# Patient Record
Sex: FEMALE | Race: WHITE | NOT HISPANIC OR LATINO | URBAN - METROPOLITAN AREA
[De-identification: names, ages, dates, MRNs, and addresses within clinical notes are randomized per-mention and may not be internally consistent; named-entity substitution may affect disease eponyms.]

---

## 2017-01-04 DIAGNOSIS — C85.91 NON-HODGKIN LYMPHOMA, UNSPECIFIED, LYMPH NODES OF HEAD, FACE, AND NECK: ICD-10-CM

## 2017-01-24 PROBLEM — C85.91: Status: ACTIVE | Noted: 2017-01-24

## 2017-05-03 ENCOUNTER — OUTPATIENT (OUTPATIENT)
Dept: OUTPATIENT SERVICES | Facility: HOSPITAL | Age: 72
LOS: 1 days | Discharge: HOME | End: 2017-05-03

## 2017-05-05 ENCOUNTER — OUTPATIENT (OUTPATIENT)
Dept: OUTPATIENT SERVICES | Facility: HOSPITAL | Age: 72
LOS: 1 days | Discharge: HOME | End: 2017-05-05

## 2017-05-08 ENCOUNTER — OUTPATIENT (OUTPATIENT)
Dept: OUTPATIENT SERVICES | Facility: HOSPITAL | Age: 72
LOS: 1 days | Discharge: HOME | End: 2017-05-08

## 2017-05-26 ENCOUNTER — OUTPATIENT (OUTPATIENT)
Dept: OUTPATIENT SERVICES | Facility: HOSPITAL | Age: 72
LOS: 1 days | Discharge: HOME | End: 2017-05-26

## 2017-06-28 DIAGNOSIS — R79.9 ABNORMAL FINDING OF BLOOD CHEMISTRY, UNSPECIFIED: ICD-10-CM

## 2017-06-28 DIAGNOSIS — D50.9 IRON DEFICIENCY ANEMIA, UNSPECIFIED: ICD-10-CM

## 2017-06-28 DIAGNOSIS — D64.9 ANEMIA, UNSPECIFIED: ICD-10-CM

## 2017-06-28 DIAGNOSIS — L02.32 FURUNCLE OF BUTTOCK: ICD-10-CM

## 2017-07-21 ENCOUNTER — INPATIENT (INPATIENT)
Facility: HOSPITAL | Age: 72
LOS: 2 days | Discharge: HOME | End: 2017-07-24
Attending: INTERNAL MEDICINE | Admitting: FAMILY MEDICINE

## 2017-07-21 DIAGNOSIS — I10 ESSENTIAL (PRIMARY) HYPERTENSION: ICD-10-CM

## 2017-07-21 DIAGNOSIS — N17.9 ACUTE KIDNEY FAILURE, UNSPECIFIED: ICD-10-CM

## 2017-07-21 DIAGNOSIS — E11.9 TYPE 2 DIABETES MELLITUS WITHOUT COMPLICATIONS: ICD-10-CM

## 2017-07-21 DIAGNOSIS — E78.00 PURE HYPERCHOLESTEROLEMIA, UNSPECIFIED: ICD-10-CM

## 2017-07-21 DIAGNOSIS — R22.1 LOCALIZED SWELLING, MASS AND LUMP, NECK: ICD-10-CM

## 2017-07-21 DIAGNOSIS — M10.9 GOUT, UNSPECIFIED: ICD-10-CM

## 2017-07-21 DIAGNOSIS — L40.9 PSORIASIS, UNSPECIFIED: ICD-10-CM

## 2017-07-27 DIAGNOSIS — N18.9 CHRONIC KIDNEY DISEASE, UNSPECIFIED: ICD-10-CM

## 2017-07-27 DIAGNOSIS — E86.1 HYPOVOLEMIA: ICD-10-CM

## 2017-07-27 DIAGNOSIS — T45.1X5A ADVERSE EFFECT OF ANTINEOPLASTIC AND IMMUNOSUPPRESSIVE DRUGS, INITIAL ENCOUNTER: ICD-10-CM

## 2017-07-27 DIAGNOSIS — E87.1 HYPO-OSMOLALITY AND HYPONATREMIA: ICD-10-CM

## 2017-07-27 DIAGNOSIS — N39.0 URINARY TRACT INFECTION, SITE NOT SPECIFIED: ICD-10-CM

## 2017-07-27 DIAGNOSIS — Z79.84 LONG TERM (CURRENT) USE OF ORAL HYPOGLYCEMIC DRUGS: ICD-10-CM

## 2017-07-27 DIAGNOSIS — K27.9 PEPTIC ULCER, SITE UNSPECIFIED, UNSPECIFIED AS ACUTE OR CHRONIC, WITHOUT HEMORRHAGE OR PERFORATION: ICD-10-CM

## 2017-07-27 DIAGNOSIS — N18.3 CHRONIC KIDNEY DISEASE, STAGE 3 (MODERATE): ICD-10-CM

## 2017-07-27 DIAGNOSIS — E87.2 ACIDOSIS: ICD-10-CM

## 2017-07-27 DIAGNOSIS — E11.22 TYPE 2 DIABETES MELLITUS WITH DIABETIC CHRONIC KIDNEY DISEASE: ICD-10-CM

## 2017-07-27 DIAGNOSIS — E86.0 DEHYDRATION: ICD-10-CM

## 2017-07-27 DIAGNOSIS — D63.1 ANEMIA IN CHRONIC KIDNEY DISEASE: ICD-10-CM

## 2017-07-27 DIAGNOSIS — E11.42 TYPE 2 DIABETES MELLITUS WITH DIABETIC POLYNEUROPATHY: ICD-10-CM

## 2017-07-27 DIAGNOSIS — K59.00 CONSTIPATION, UNSPECIFIED: ICD-10-CM

## 2017-07-27 DIAGNOSIS — N17.9 ACUTE KIDNEY FAILURE, UNSPECIFIED: ICD-10-CM

## 2017-07-27 DIAGNOSIS — Z87.891 PERSONAL HISTORY OF NICOTINE DEPENDENCE: ICD-10-CM

## 2017-07-27 DIAGNOSIS — I12.9 HYPERTENSIVE CHRONIC KIDNEY DISEASE WITH STAGE 1 THROUGH STAGE 4 CHRONIC KIDNEY DISEASE, OR UNSPECIFIED CHRONIC KIDNEY DISEASE: ICD-10-CM

## 2017-07-27 DIAGNOSIS — M10.9 GOUT, UNSPECIFIED: ICD-10-CM

## 2017-07-27 DIAGNOSIS — L40.9 PSORIASIS, UNSPECIFIED: ICD-10-CM

## 2017-07-28 DIAGNOSIS — K12.1 OTHER FORMS OF STOMATITIS: ICD-10-CM

## 2018-10-04 ENCOUNTER — OUTPATIENT (OUTPATIENT)
Dept: OUTPATIENT SERVICES | Facility: HOSPITAL | Age: 73
LOS: 1 days | Discharge: HOME | End: 2018-10-04

## 2018-10-04 DIAGNOSIS — L08.0 PYODERMA: ICD-10-CM

## 2020-03-22 ENCOUNTER — TRANSCRIPTION ENCOUNTER (OUTPATIENT)
Age: 75
End: 2020-03-22

## 2020-03-22 ENCOUNTER — INPATIENT (INPATIENT)
Facility: HOSPITAL | Age: 75
LOS: 1 days | Discharge: HOME | End: 2020-03-24
Attending: INTERNAL MEDICINE | Admitting: INTERNAL MEDICINE
Payer: MEDICARE

## 2020-03-22 VITALS
HEART RATE: 82 BPM | TEMPERATURE: 98 F | OXYGEN SATURATION: 98 % | DIASTOLIC BLOOD PRESSURE: 63 MMHG | SYSTOLIC BLOOD PRESSURE: 132 MMHG | RESPIRATION RATE: 18 BRPM

## 2020-03-22 DIAGNOSIS — Z88.8 ALLERGY STATUS TO OTHER DRUGS, MEDICAMENTS AND BIOLOGICAL SUBSTANCES: ICD-10-CM

## 2020-03-22 DIAGNOSIS — N18.3 CHRONIC KIDNEY DISEASE, STAGE 3 (MODERATE): ICD-10-CM

## 2020-03-22 LAB
ALBUMIN SERPL ELPH-MCNC: 3.9 G/DL — SIGNIFICANT CHANGE UP (ref 3.5–5.2)
ALP SERPL-CCNC: 87 U/L — SIGNIFICANT CHANGE UP (ref 30–115)
ALT FLD-CCNC: 13 U/L — SIGNIFICANT CHANGE UP (ref 0–41)
ANION GAP SERPL CALC-SCNC: 14 MMOL/L — SIGNIFICANT CHANGE UP (ref 7–14)
ANION GAP SERPL CALC-SCNC: 17 MMOL/L — HIGH (ref 7–14)
AST SERPL-CCNC: 23 U/L — SIGNIFICANT CHANGE UP (ref 0–41)
BASE EXCESS BLDV CALC-SCNC: -6.7 MMOL/L — LOW (ref -2–2)
BILIRUB SERPL-MCNC: 0.4 MG/DL — SIGNIFICANT CHANGE UP (ref 0.2–1.2)
BUN SERPL-MCNC: 62 MG/DL — CRITICAL HIGH (ref 10–20)
BUN SERPL-MCNC: 67 MG/DL — CRITICAL HIGH (ref 10–20)
CA-I SERPL-SCNC: 1.17 MMOL/L — SIGNIFICANT CHANGE UP (ref 1.12–1.3)
CALCIUM SERPL-MCNC: 8.4 MG/DL — LOW (ref 8.5–10.1)
CALCIUM SERPL-MCNC: 9 MG/DL — SIGNIFICANT CHANGE UP (ref 8.5–10.1)
CHLORIDE SERPL-SCNC: 106 MMOL/L — SIGNIFICANT CHANGE UP (ref 98–110)
CHLORIDE SERPL-SCNC: 97 MMOL/L — LOW (ref 98–110)
CO2 SERPL-SCNC: 18 MMOL/L — SIGNIFICANT CHANGE UP (ref 17–32)
CO2 SERPL-SCNC: 18 MMOL/L — SIGNIFICANT CHANGE UP (ref 17–32)
CREAT SERPL-MCNC: 2.6 MG/DL — HIGH (ref 0.7–1.5)
CREAT SERPL-MCNC: 2.7 MG/DL — HIGH (ref 0.7–1.5)
D DIMER BLD IA.RAPID-MCNC: 383 NG/ML DDU — HIGH (ref 0–230)
ERYTHROCYTE [SEDIMENTATION RATE] IN BLOOD: 107 MM/HR — HIGH (ref 0–20)
FIBRINOGEN PPP-MCNC: >700 MG/DL — HIGH (ref 204.4–570.6)
FLU A RESULT: NEGATIVE — SIGNIFICANT CHANGE UP
FLU A RESULT: NEGATIVE — SIGNIFICANT CHANGE UP
FLUAV AG NPH QL: NEGATIVE — SIGNIFICANT CHANGE UP
FLUBV AG NPH QL: NEGATIVE — SIGNIFICANT CHANGE UP
GAS PNL BLDV: 134 MMOL/L — LOW (ref 136–145)
GAS PNL BLDV: SIGNIFICANT CHANGE UP
GLUCOSE BLDC GLUCOMTR-MCNC: 80 MG/DL — SIGNIFICANT CHANGE UP (ref 70–99)
GLUCOSE SERPL-MCNC: 138 MG/DL — HIGH (ref 70–99)
GLUCOSE SERPL-MCNC: 91 MG/DL — SIGNIFICANT CHANGE UP (ref 70–99)
HCO3 BLDV-SCNC: 20 MMOL/L — LOW (ref 22–29)
HCT VFR BLD CALC: 35.3 % — LOW (ref 37–47)
HCT VFR BLDA CALC: 37.5 % — SIGNIFICANT CHANGE UP (ref 34–44)
HGB BLD CALC-MCNC: 12.2 G/DL — LOW (ref 14–18)
HGB BLD-MCNC: 11.7 G/DL — LOW (ref 12–16)
LACTATE BLDV-MCNC: 1.8 MMOL/L — HIGH (ref 0.5–1.6)
LDH SERPL L TO P-CCNC: 272 — HIGH (ref 50–242)
MCHC RBC-ENTMCNC: 31.8 PG — HIGH (ref 27–31)
MCHC RBC-ENTMCNC: 33.1 G/DL — SIGNIFICANT CHANGE UP (ref 32–37)
MCV RBC AUTO: 95.9 FL — SIGNIFICANT CHANGE UP (ref 81–99)
NRBC # BLD: 0 /100 WBCS — SIGNIFICANT CHANGE UP (ref 0–0)
NT-PROBNP SERPL-SCNC: 680 PG/ML — HIGH (ref 0–300)
PCO2 BLDV: 42 MMHG — SIGNIFICANT CHANGE UP (ref 41–51)
PH BLDV: 7.28 — SIGNIFICANT CHANGE UP (ref 7.26–7.43)
PLATELET # BLD AUTO: 212 K/UL — SIGNIFICANT CHANGE UP (ref 130–400)
PO2 BLDV: 16 MMHG — LOW (ref 20–40)
POTASSIUM BLDV-SCNC: 4.5 MMOL/L — SIGNIFICANT CHANGE UP (ref 3.3–5.6)
POTASSIUM SERPL-MCNC: 5.3 MMOL/L — HIGH (ref 3.5–5)
POTASSIUM SERPL-MCNC: 5.4 MMOL/L — HIGH (ref 3.5–5)
POTASSIUM SERPL-SCNC: 5.3 MMOL/L — HIGH (ref 3.5–5)
POTASSIUM SERPL-SCNC: 5.4 MMOL/L — HIGH (ref 3.5–5)
PROT SERPL-MCNC: 6.3 G/DL — SIGNIFICANT CHANGE UP (ref 6–8)
RBC # BLD: 3.68 M/UL — LOW (ref 4.2–5.4)
RBC # FLD: 13.7 % — SIGNIFICANT CHANGE UP (ref 11.5–14.5)
RSV RESULT: NEGATIVE — SIGNIFICANT CHANGE UP
RSV RNA RESP QL NAA+PROBE: NEGATIVE — SIGNIFICANT CHANGE UP
SAO2 % BLDV: 16 % — SIGNIFICANT CHANGE UP
SODIUM SERPL-SCNC: 132 MMOL/L — LOW (ref 135–146)
SODIUM SERPL-SCNC: 138 MMOL/L — SIGNIFICANT CHANGE UP (ref 135–146)
TROPONIN T SERPL-MCNC: <0.01 NG/ML — SIGNIFICANT CHANGE UP
WBC # BLD: 6.37 K/UL — SIGNIFICANT CHANGE UP (ref 4.8–10.8)
WBC # FLD AUTO: 6.37 K/UL — SIGNIFICANT CHANGE UP (ref 4.8–10.8)

## 2020-03-22 PROCEDURE — 71045 X-RAY EXAM CHEST 1 VIEW: CPT | Mod: 26

## 2020-03-22 PROCEDURE — 93010 ELECTROCARDIOGRAM REPORT: CPT

## 2020-03-22 PROCEDURE — 99285 EMERGENCY DEPT VISIT HI MDM: CPT | Mod: GC

## 2020-03-22 RX ORDER — SODIUM CHLORIDE 9 MG/ML
1000 INJECTION INTRAMUSCULAR; INTRAVENOUS; SUBCUTANEOUS ONCE
Refills: 0 | Status: COMPLETED | OUTPATIENT
Start: 2020-03-22 | End: 2020-03-22

## 2020-03-22 RX ORDER — SIMVASTATIN 20 MG/1
40 TABLET, FILM COATED ORAL AT BEDTIME
Refills: 0 | Status: DISCONTINUED | OUTPATIENT
Start: 2020-03-22 | End: 2020-03-24

## 2020-03-22 RX ORDER — FOLIC ACID 0.8 MG
1 TABLET ORAL
Qty: 0 | Refills: 0 | DISCHARGE

## 2020-03-22 RX ORDER — SODIUM CHLORIDE 9 MG/ML
1000 INJECTION INTRAMUSCULAR; INTRAVENOUS; SUBCUTANEOUS
Refills: 0 | Status: DISCONTINUED | OUTPATIENT
Start: 2020-03-22 | End: 2020-03-23

## 2020-03-22 RX ORDER — ACETAMINOPHEN 500 MG
975 TABLET ORAL ONCE
Refills: 0 | Status: COMPLETED | OUTPATIENT
Start: 2020-03-22 | End: 2020-03-22

## 2020-03-22 RX ORDER — VERAPAMIL HCL 240 MG
1 CAPSULE, EXTENDED RELEASE PELLETS 24 HR ORAL
Qty: 0 | Refills: 0 | DISCHARGE

## 2020-03-22 RX ORDER — METHOTREXATE 2.5 MG/1
0 TABLET ORAL
Qty: 0 | Refills: 0 | DISCHARGE

## 2020-03-22 RX ORDER — VERAPAMIL HCL 240 MG
180 CAPSULE, EXTENDED RELEASE PELLETS 24 HR ORAL DAILY
Refills: 0 | Status: DISCONTINUED | OUTPATIENT
Start: 2020-03-22 | End: 2020-03-24

## 2020-03-22 RX ORDER — HEPARIN SODIUM 5000 [USP'U]/ML
5000 INJECTION INTRAVENOUS; SUBCUTANEOUS EVERY 12 HOURS
Refills: 0 | Status: DISCONTINUED | OUTPATIENT
Start: 2020-03-22 | End: 2020-03-24

## 2020-03-22 RX ORDER — FOLIC ACID 0.8 MG
1 TABLET ORAL DAILY
Refills: 0 | Status: DISCONTINUED | OUTPATIENT
Start: 2020-03-22 | End: 2020-03-24

## 2020-03-22 RX ORDER — ALLOPURINOL 300 MG
300 TABLET ORAL DAILY
Refills: 0 | Status: DISCONTINUED | OUTPATIENT
Start: 2020-03-22 | End: 2020-03-22

## 2020-03-22 RX ORDER — LISINOPRIL 2.5 MG/1
20 TABLET ORAL DAILY
Refills: 0 | Status: DISCONTINUED | OUTPATIENT
Start: 2020-03-22 | End: 2020-03-22

## 2020-03-22 RX ORDER — METOPROLOL TARTRATE 50 MG
1 TABLET ORAL
Qty: 0 | Refills: 0 | DISCHARGE

## 2020-03-22 RX ORDER — SIMVASTATIN 20 MG/1
1 TABLET, FILM COATED ORAL
Qty: 0 | Refills: 0 | DISCHARGE

## 2020-03-22 RX ORDER — METHOTREXATE 2.5 MG/1
2.5 TABLET ORAL
Refills: 0 | Status: DISCONTINUED | OUTPATIENT
Start: 2020-03-24 | End: 2020-03-24

## 2020-03-22 RX ADMIN — SODIUM CHLORIDE 1000 MILLILITER(S): 9 INJECTION INTRAMUSCULAR; INTRAVENOUS; SUBCUTANEOUS at 18:29

## 2020-03-22 RX ADMIN — Medication 975 MILLIGRAM(S): at 18:29

## 2020-03-22 RX ADMIN — SIMVASTATIN 40 MILLIGRAM(S): 20 TABLET, FILM COATED ORAL at 22:02

## 2020-03-22 RX ADMIN — Medication 1 MILLIGRAM(S): at 22:02

## 2020-03-22 NOTE — H&P ADULT - ATTENDING COMMENTS
I saw and evaluated the patient. I have reviewed and agree with the findings and plan of care as documented above in the resident’s note (unless indicated differently below). Any necessary changes were made in the body of the text.    73 yo F pt p/w generalized weakness and intermittent subjective fevers for about one week. Associated w/ mild SOB, non-productive coughing and diarrhea. Symptoms persistent and progressively worsening. Sick contact – went to local event 2 weeks ago where she may have been exposed. Denies foreign travel. ROS negative for myalgias, arthralgias, nausea, vomiting, dysuria, headaches and skin changes. States that nothing like this has happened to her before.    ROS:  Constitutional: +fever; no weight loss; +generalized weakness  Eyes: no conjunctivitis or itching  ENT: no dysphagia or odynophagia  CVS: no LE swelling or PND  Resp: +SOB; +coughing   GI: no nausea; no vomiting; +diarrhea  : no dysuria or hematuria  MSK: no myalgias; no arthralgias  Skin: no skin rashes  All other systems reviewed and are negative    PMHx: psoriasis, gout, HTN, HLD  Meds: lisinopril 20, allopurinol 300, methotrexate 2.5 weekly, folic acid, simvastatin 20  PSurHx: lump removal – neck  FHx: HTN – father  SocHx: up to 2 PPD former smoker x 20 yrs (stopped “many yrs ago”); no alcohol, drug use    Exam:  Vitals: BP = 167/89; P = 60; T = 98.6  General: appears stated age; pleasant and cooperative  Eyes: anicteric sclerae; moist conjunctiva; no lid lag; PERRLA  HENT: AT/NC; clear oropharynx w/ moist mucous membranes  Neck: supple w/ FROM; trachea midline; no thyromegaly  Lungs: lungs cta b/l; no tachypnea; non-labored breathing  CVS: RRR; S1 and S2 w/o MRG  Abd: BS+; soft; non tender to palpation; no masses or HSM  Extremities: LE’s non-edematous; peripheral pulses 2+ b/l  Skin: skin turgor, texture normal; no rashes, nodules or ulcers  Psych: appropriate affect; alert; oriented to person, place, time and situation    Labs: normocytic anemia; mild hyperkalemia; BUN/Cr = 62/2.7 (40/1.25 on 2017); LDH = 272; d-dimer 383; fibrinogen > 700; VB.28/42 w/ bicarb 18 on BMP; hyperkalemia  EKG: NSR; no ST-T wave changes; no peaking of T waves  CXR: b/l infiltrates    Assessment:  (1) Generalized weakness, SOB and coughing: suspect viral respiratory syndrome (r/o covid-19)  (2) Suspect acute renal insufficiency possibly pre-renal 2/2 ongoing ailment and poor PO intake  (3) VBG shows resp acidosis and combined high AG and non AG metabolic acidosis which is likely 2/2 renal failure  (4) Hx of CKD possibly stage 3 (old records reveal Cr values ranging from 1.25 to 4.4 (in 2017)  (5) Hypertension – need for optimization  (6) Dyslipidemia – on out-pt statin therapy  (7) Hx of psoriasis – stable on methotrexate  (8) Hx of gout – w/o acute symptoms on allopurinol  (9) Normocytic anemia likely anemia of chronic disease    Plan:  (1) Supportive care: anti-pyretics, anti-emetics, analgesics and anti-tussives to be given PRN  (2) Isolation: airborne/contact/droplet  (3) F/u CRP, CPK, procalcitonin, lipid panel and ferritin; trend CBC and LFT’s  (4) Hold off on hydroxychloroquine – pt is not hypoxic  (5) Avoid high flow and NIPPV - aerosol generating   (6) Avoid NSAIDs and systemic glucocorticoids  (7) IVF’s – gentle hydration (trend Cr); send U/A, urine Na, urine Cr  (8) Advance diet as tolerated  (9) C/w meds as ordered    Code status: full code

## 2020-03-22 NOTE — ED PROVIDER NOTE - CLINICAL SUMMARY MEDICAL DECISION MAKING FREE TEXT BOX
Patient presented with generalized weakness, diarrhea, cough that has been worsening. Otherwise patient afebrile, HD stable. Obtained labs which showed (+) PINEDA compared to baseline, as well as metabolic acidosis which is 2/2 likely to diarrhea. Flu swab negative, but CXR with bilateral opacities, suspicious for COVID. Patient had been placed on iso initially and remained on iso precautions during ED course. Despite tx in ED patient still feeling weak, does not feel safe going home. Will admit for further monitoring and management. Patient agreeable with plan. Hd stable at time of admission.

## 2020-03-22 NOTE — ED PROVIDER NOTE - OBJECTIVE STATEMENT
73 yo F with hx of HTN, DM, presents for evaluation of generalized weakness, ongoing for 3 days, associated with diarrhea, described as loose. Pt also reports cough and dyspnea on exertion. No fever, no chills, no headache, no nausea, no vomiting, no chest pain, no back pain, no rash, no recent travel, no recent abx use. Pt states she went to urgent care, and was told to come to the ED for further testing. No other symptoms reported.  PMD: Moreno Gardner

## 2020-03-22 NOTE — H&P ADULT - HISTORY OF PRESENT ILLNESS
73 yo F with hx of HTN, DM, presents for evaluation of generalized weakness, ongoing for 3 days, associated with diarrhea, described as loose.   Pt also reports cough and dyspnea on exertion. No fever, no chills, no headache, no nausea, no vomiting, no chest pain, no back pain, no rash, no recent travel, no recent abx use.     Pt states she went to urgent care, and was told to come to the ED for further testing. No other symptoms reported. 75 yo F with Med hx of HTN, DM complicated by Neuropathy, Gout, Psoriasis, presents for evaluation of generalized weakness, Dry Cough, SOB for 3-4 days duration. Patient states that she was at a function 2 weeks ago and she recently found out that her daughter's sister in-law who was also at the function tested positive for COVID and is currently on quarantine at home. Pt states she went to urgent care, and was told to come to the ED for further testing. Patient also endorses diarrhea for about one week which got worse today, associated with blood when she wipes but its consistent with her hemorrhoids     Patient denies fever, chills, headache, Abdominal pain, nausea, vomiting, Chest pain, Palpitation, no recent travel     In ED vitals and Labs were unremarkable

## 2020-03-22 NOTE — ED PROVIDER NOTE - CARE PLAN
Principal Discharge DX:	Weakness  Secondary Diagnosis:	Diarrhea  Secondary Diagnosis:	Acute on chronic kidney failure

## 2020-03-22 NOTE — H&P ADULT - ASSESSMENT
73 yo F with hx of HTN, DM, presents for evaluation of generalized weakness,            PINEDA on CKD    H/O Essential Hypertension    H/O DM II   -Takes .............  -FS before meals and at bed time  -Carb Consistent diet  -Insulin regimen     DVT PPX// GI PPX  Full Code  Dispo from   -Pending COVID r/o 73 yo F with Med hx of HTN, DM complicated by Neuropathy, Gout, Psoriasis, presents for evaluation of generalized weakness, Dry Cough, SOB for 3-4 days duration.    Generalized weakness/dry cough and SOB: R/O COVID  -High Probability of COVID  from recent exposure   -No fever, no Lymphopenia, FLU and RSV neg   -CXR: Shows B/L infiltrates similar to prior xrays, (Pending official read)  -Saturating well on RA (98%) , Consider CT chest if patients respiratory status declines   -Associated Covid labs Ordered     PINEDA on CKD: likely pre-renal from Diarrhea and low PO intake   -Baseline SCr unknown ( last Scr in 2017: 1.25)   -IVF for additional 24hrs   -monitor BMP     H/O Essential Hypertension  -c/w Verapamil and lisinopril    H/O DM II   -Diet Controlled, per pt: A1C: 5.5% for the last 2 yrs   -Refusing FS  -Carb Consistent diet    DVT PPX// GI PPX: Heparin Sub Q// not indicated   Full Code  Dispo from   -Pending COVID r/o 73 yo F with Med hx of HTN, DM complicated by Neuropathy, Gout, Psoriasis, presents for evaluation of generalized weakness, Dry Cough, SOB for 3-4 days duration.    Generalized weakness/dry cough and SOB: R/O COVID  -High Probability of COVID  from recent exposure   -No fever, no Lymphopenia, FLU and RSV neg   -CXR: Shows B/L infiltrates similar to prior xrays, (Pending official read)  -Saturating well on RA (98%) , Consider CT chest if patients respiratory status declines   -Associated Covid labs Ordered   -F/Up ID consult     PINEDA on CKD: likely pre-renal from Diarrhea and low PO intake   -Baseline SCr unknown ( last Scr in 2017: 1.25)   -IVF for additional 24hrs   -monitor BMP     H/O Essential Hypertension: appears controlled   -c/w Verapamil and lisinopril    H/O DM II   -Diet Controlled, per pt: A1C: 5.5% for the last 2 yrs   -Refusing FS, c/w Carb Consistent diet    DVT PPX// GI PPX: Heparin Sub Q// not indicated   Full Code  Dispo from home   -Pending COVID r/o

## 2020-03-22 NOTE — H&P ADULT - NSHPLABSRESULTS_GEN_ALL_CORE
LABS:                          11.7   6.37  )-----------( 212      ( 22 Mar 2020 17:06 )             35.3     03-22    132<L>  |  97<L>  |  67<HH>  ----------------------------<  138<H>  5.3<H>   |  18  |  2.6<H>    Ca    9.0      22 Mar 2020 17:06    TPro  6.3  /  Alb  3.9  /  TBili  0.4  /  DBili  x   /  AST  23  /  ALT  13  /  AlkPhos  87  03-22              Lactate Trend    CARDIAC MARKERS ( 22 Mar 2020 17:06 )  x     / <0.01 ng/mL / x     / x     / x          CAPILLARY BLOOD GLUCOSE            RADIOLOGY:            EKGS:

## 2020-03-22 NOTE — H&P ADULT - NSICDXPASTMEDICALHX_GEN_ALL_CORE_FT
PAST MEDICAL HISTORY:  CKD (chronic kidney disease)     DM (diabetes mellitus)     High cholesterol     HTN (hypertension)     Lymphoma PAST MEDICAL HISTORY:  CKD (chronic kidney disease)     DM (diabetes mellitus)     Gout     High cholesterol     HTN (hypertension)     Lymphoma     Psoriasis

## 2020-03-22 NOTE — ED ADULT NURSE NOTE - OBJECTIVE STATEMENT
Pt arrived to ED due to feeling of exhaustion and lethargic for past 3 days. Pt c/o of SOB on exertion, black watery stool  3x/day for past 3 days. Pt assessed, A&ox4, vss, Pt denies any fever, n/v. Pt's abdomen is soft non-tender. B/L lungs auscultated - clear. Will continue to monitor and assess.

## 2020-03-22 NOTE — ED ADULT TRIAGE NOTE - CHIEF COMPLAINT QUOTE
feeling very exhausted and sleepy, feels dyspnea on exertion and states she had black watery stools 3 times a day for the past 3 or 4 days, coming from urgent care for further evaluation had positive guaiac

## 2020-03-22 NOTE — ED PROVIDER NOTE - ATTENDING CONTRIBUTION TO CARE
74 year old female, pmhx as documented above, presenting with generalized weakness x 3 days associated with diarrhea. Patient also with cough and dyspnea on exertion over this time. Went to  and sent here for evaluation. Patient denies having this before. Otherwise denies pain and denies fever, headache, vision changes, numbness, confusion, URI symptoms, neck pain, chest pain, back pain, dyspnea, palpitations, nausea, vomiting, abdominal pain, constipation, blood in stool/dark stools, urinary symptoms, vaginal bleeding/discharge, leg swelling, rash, recent travel or sick contacts.    Vital Signs: I have reviewed the initial vital signs.  Constitutional: NAD, well-nourished, appears stated age, no acute distress.  HEENT: Airway patent, moist MM, no erythema/swelling/deformity of oral structures. EOMI, PERRLA.  CV: regular rate, regular rhythm, well-perfused extremities, 2+ b/l DP and radial pulses equal.  Lungs: BCTA, no increased WOB.  ABD: NTND, no guarding or rebound, no pulsatile mass, no hernias.   MSK: Neck supple, nontender, nl ROM, no stepoff. Chest nontender. Back nontender in TLS spine or to b/l bony structures or flanks. Ext nontender, nl rom, no deformity.   INTEG: Skin warm, dry, no rash.  NEURO: A&Ox3, normal strength, nl sensation throughout, normal speech.   PSYCH: Calm, cooperative, normal affect and interaction.    Will obtain labs, CXR, Flu swab, consider COVID swab as well, re-eval.

## 2020-03-22 NOTE — ED PROVIDER NOTE - PMH
CKD (chronic kidney disease)    DM (diabetes mellitus)    Gout    High cholesterol    HTN (hypertension)    Lymphoma    Psoriasis

## 2020-03-22 NOTE — ED ADULT NURSE NOTE - PMH
CKD (chronic kidney disease)    DM (diabetes mellitus)    High cholesterol    HTN (hypertension)    Lymphoma

## 2020-03-22 NOTE — ED PROVIDER NOTE - GASTROINTESTINAL, MLM
Abdomen soft, non-tender, non distended, no rebound, no rigidity, no guarding. NO CVA tenderness. Rectal: hemorrhoids, no stool, no black stool

## 2020-03-22 NOTE — H&P ADULT - NSHPPHYSICALEXAM_GEN_ALL_CORE
General    Heart    Lungs    Abdomen    Musculoskeletal    Neuro Deferred at this time, Pls see attending' s note to follow

## 2020-03-23 LAB
ALBUMIN SERPL ELPH-MCNC: 3.4 G/DL — LOW (ref 3.5–5.2)
ALP SERPL-CCNC: 75 U/L — SIGNIFICANT CHANGE UP (ref 30–115)
ALT FLD-CCNC: 11 U/L — SIGNIFICANT CHANGE UP (ref 0–41)
ANION GAP SERPL CALC-SCNC: 13 MMOL/L — SIGNIFICANT CHANGE UP (ref 7–14)
APPEARANCE UR: ABNORMAL
AST SERPL-CCNC: 21 U/L — SIGNIFICANT CHANGE UP (ref 0–41)
BACTERIA # UR AUTO: NEGATIVE — SIGNIFICANT CHANGE UP
BASOPHILS # BLD AUTO: 0.02 K/UL — SIGNIFICANT CHANGE UP (ref 0–0.2)
BASOPHILS NFR BLD AUTO: 0.4 % — SIGNIFICANT CHANGE UP (ref 0–1)
BILIRUB SERPL-MCNC: 0.3 MG/DL — SIGNIFICANT CHANGE UP (ref 0.2–1.2)
BILIRUB UR-MCNC: NEGATIVE — SIGNIFICANT CHANGE UP
BUN SERPL-MCNC: 63 MG/DL — CRITICAL HIGH (ref 10–20)
CALCIUM SERPL-MCNC: 8.5 MG/DL — SIGNIFICANT CHANGE UP (ref 8.5–10.1)
CHLORIDE SERPL-SCNC: 106 MMOL/L — SIGNIFICANT CHANGE UP (ref 98–110)
CO2 SERPL-SCNC: 16 MMOL/L — LOW (ref 17–32)
COLOR SPEC: YELLOW — SIGNIFICANT CHANGE UP
CREAT ?TM UR-MCNC: 114 MG/DL — SIGNIFICANT CHANGE UP
CREAT SERPL-MCNC: 2.3 MG/DL — HIGH (ref 0.7–1.5)
CRP SERPL-MCNC: 6.1 MG/DL — HIGH (ref 0–0.4)
DIFF PNL FLD: NEGATIVE — SIGNIFICANT CHANGE UP
EOSINOPHIL # BLD AUTO: 0.11 K/UL — SIGNIFICANT CHANGE UP (ref 0–0.7)
EOSINOPHIL NFR BLD AUTO: 2.1 % — SIGNIFICANT CHANGE UP (ref 0–8)
EPI CELLS # UR: 3 /HPF — SIGNIFICANT CHANGE UP (ref 0–5)
FERRITIN SERPL-MCNC: 753 NG/ML — HIGH (ref 15–150)
GLUCOSE BLDC GLUCOMTR-MCNC: 106 MG/DL — HIGH (ref 70–99)
GLUCOSE BLDC GLUCOMTR-MCNC: 114 MG/DL — HIGH (ref 70–99)
GLUCOSE BLDC GLUCOMTR-MCNC: 148 MG/DL — HIGH (ref 70–99)
GLUCOSE BLDC GLUCOMTR-MCNC: 71 MG/DL — SIGNIFICANT CHANGE UP (ref 70–99)
GLUCOSE SERPL-MCNC: 74 MG/DL — SIGNIFICANT CHANGE UP (ref 70–99)
GLUCOSE UR QL: NEGATIVE — SIGNIFICANT CHANGE UP
HCT VFR BLD CALC: 33 % — LOW (ref 37–47)
HGB BLD-MCNC: 10.5 G/DL — LOW (ref 12–16)
HYALINE CASTS # UR AUTO: 8 /LPF — HIGH (ref 0–7)
IMM GRANULOCYTES NFR BLD AUTO: 0.4 % — HIGH (ref 0.1–0.3)
KETONES UR-MCNC: NEGATIVE — SIGNIFICANT CHANGE UP
LEUKOCYTE ESTERASE UR-ACNC: NEGATIVE — SIGNIFICANT CHANGE UP
LYMPHOCYTES # BLD AUTO: 0.9 K/UL — LOW (ref 1.2–3.4)
LYMPHOCYTES # BLD AUTO: 17.4 % — LOW (ref 20.5–51.1)
MCHC RBC-ENTMCNC: 30.3 PG — SIGNIFICANT CHANGE UP (ref 27–31)
MCHC RBC-ENTMCNC: 31.8 G/DL — LOW (ref 32–37)
MCV RBC AUTO: 95.4 FL — SIGNIFICANT CHANGE UP (ref 81–99)
MONOCYTES # BLD AUTO: 0.39 K/UL — SIGNIFICANT CHANGE UP (ref 0.1–0.6)
MONOCYTES NFR BLD AUTO: 7.6 % — SIGNIFICANT CHANGE UP (ref 1.7–9.3)
NEUTROPHILS # BLD AUTO: 3.72 K/UL — SIGNIFICANT CHANGE UP (ref 1.4–6.5)
NEUTROPHILS NFR BLD AUTO: 72.1 % — SIGNIFICANT CHANGE UP (ref 42.2–75.2)
NITRITE UR-MCNC: NEGATIVE — SIGNIFICANT CHANGE UP
NRBC # BLD: 0 /100 WBCS — SIGNIFICANT CHANGE UP (ref 0–0)
PH UR: 5.5 — SIGNIFICANT CHANGE UP (ref 5–8)
PLATELET # BLD AUTO: 186 K/UL — SIGNIFICANT CHANGE UP (ref 130–400)
POTASSIUM SERPL-MCNC: 5.2 MMOL/L — HIGH (ref 3.5–5)
POTASSIUM SERPL-SCNC: 5.2 MMOL/L — HIGH (ref 3.5–5)
PROCALCITONIN SERPL-MCNC: 0.25 NG/ML — HIGH (ref 0.02–0.1)
PROT SERPL-MCNC: 5.4 G/DL — LOW (ref 6–8)
PROT UR-MCNC: SIGNIFICANT CHANGE UP
RBC # BLD: 3.46 M/UL — LOW (ref 4.2–5.4)
RBC # FLD: 13.7 % — SIGNIFICANT CHANGE UP (ref 11.5–14.5)
RBC CASTS # UR COMP ASSIST: 3 /HPF — SIGNIFICANT CHANGE UP (ref 0–4)
SODIUM SERPL-SCNC: 135 MMOL/L — SIGNIFICANT CHANGE UP (ref 135–146)
SODIUM UR-SCNC: 22 MMOL/L — SIGNIFICANT CHANGE UP
SP GR SPEC: 1.01 — SIGNIFICANT CHANGE UP (ref 1.01–1.02)
UROBILINOGEN FLD QL: SIGNIFICANT CHANGE UP
WBC # BLD: 5.16 K/UL — SIGNIFICANT CHANGE UP (ref 4.8–10.8)
WBC # FLD AUTO: 5.16 K/UL — SIGNIFICANT CHANGE UP (ref 4.8–10.8)
WBC UR QL: 17 /HPF — HIGH (ref 0–5)

## 2020-03-23 PROCEDURE — 99233 SBSQ HOSP IP/OBS HIGH 50: CPT

## 2020-03-23 RX ORDER — SODIUM CHLORIDE 9 MG/ML
1000 INJECTION, SOLUTION INTRAVENOUS
Refills: 0 | Status: DISCONTINUED | OUTPATIENT
Start: 2020-03-23 | End: 2020-03-24

## 2020-03-23 RX ADMIN — Medication 1 MILLIGRAM(S): at 12:39

## 2020-03-23 RX ADMIN — SODIUM CHLORIDE 60 MILLILITER(S): 9 INJECTION INTRAMUSCULAR; INTRAVENOUS; SUBCUTANEOUS at 00:10

## 2020-03-23 RX ADMIN — SODIUM CHLORIDE 60 MILLILITER(S): 9 INJECTION, SOLUTION INTRAVENOUS at 17:47

## 2020-03-23 RX ADMIN — SIMVASTATIN 40 MILLIGRAM(S): 20 TABLET, FILM COATED ORAL at 21:35

## 2020-03-23 RX ADMIN — HEPARIN SODIUM 5000 UNIT(S): 5000 INJECTION INTRAVENOUS; SUBCUTANEOUS at 17:47

## 2020-03-23 RX ADMIN — Medication 180 MILLIGRAM(S): at 05:16

## 2020-03-23 RX ADMIN — HEPARIN SODIUM 5000 UNIT(S): 5000 INJECTION INTRAVENOUS; SUBCUTANEOUS at 05:16

## 2020-03-23 NOTE — CONSULT NOTE ADULT - ASSESSMENT
ASSESSMENT  73 yo F with Med hx of HTN, DM complicated by Neuropathy, Gout, Psoriasis, presents for evaluation of generalized weakness, Dry Cough, SOB for 3-4 days duration. Patient states that she was at a function 2 weeks ago and she recently found out that her daughter's sister in-law who was also at the function tested positive for COVID and is currently on quarantine at home.    IMPRESSION  #ruling out covid-19, +contact   no hypoxemia, afebrile    WBC 5 0.90 abs lymphs    < from: Xray Chest 1 View-PORTABLE IMMEDIATE (03.22.20 @ 17:27) >  Questionable new 9 mm right upper lobe nodular opacity; if clinically warranted, further evaluation may be obtained with dedicated CT chest  #Sepsis ruled out on admission   #Hyponatremia  #Immunosuppressed on MTX    RECOMMENDATIONS  - monitor off antimicrobials  - D/C as soon as possible   - outpatient CT for possible opacity     Spectra [id number]
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 CKD / prerenal ? improved with IVF / acidosis due to diarrhea   ·	continue IVF and switch to 1/2 NS with 75 meq of bicarb   ·	check UA and urine lytes   ·	check renal bladder sono  ·	to rule out Covid 19   ·	no need for RRT    will follow

## 2020-03-23 NOTE — CONSULT NOTE ADULT - SUBJECTIVE AND OBJECTIVE BOX
NEPHROLOGY CONSULTATION NOTE    THIS CONSULT IS INCOMPLETE / FULL CONSULT TO FOLLOW    Patient is a 74y Female whom presented to the hospital with     PAST MEDICAL & SURGICAL HISTORY:  Psoriasis  Gout  High cholesterol  DM (diabetes mellitus)  Lymphoma  CKD (chronic kidney disease)  HTN (hypertension)    Allergies:  predniSONE (Unknown)    Home Medications Reviewed  Hospital Medications:   MEDICATIONS  (STANDING):  folic acid 1 milliGRAM(s) Oral daily  heparin  Injectable 5000 Unit(s) SubCutaneous every 12 hours  simvastatin 40 milliGRAM(s) Oral at bedtime  sodium chloride 0.9%. 1000 milliLiter(s) (60 mL/Hr) IV Continuous <Continuous>  verapamil  milliGRAM(s) Oral daily      SOCIAL HISTORY:  Denies ETOH,Smoking,   FAMILY HISTORY:        REVIEW OF SYSTEMS:  CONSTITUTIONAL: No weakness, fevers or chills  EYES/ENT: No visual changes;  No vertigo or throat pain   NECK: No pain or stiffness  RESPIRATORY: No cough, wheezing, hemoptysis; No shortness of breath  CARDIOVASCULAR: No chest pain or palpitations.  GASTROINTESTINAL: No abdominal or epigastric pain. No nausea, vomiting, or hematemesis; No diarrhea or constipation. No melena or hematochezia.  GENITOURINARY: No dysuria, frequency, foamy urine, urinary urgency, incontinence or hematuria  NEUROLOGICAL: No numbness or weakness  SKIN: No itching, burning, rashes, or lesions   VASCULAR: No bilateral lower extremity edema.   All other review of systems is negative unless indicated above.    VITALS:  T(F): 98 (20 @ 13:03), Max: 98.9 (20 @ 17:03)  HR: 70 (20 @ 13:03)  BP: 138/62 (20 @ 13:03)  RR: 19 (20 @ 13:03)  SpO2: 98% (20 @ 00:07)        I&O's Detail        PHYSICAL EXAM:  Constitutional: NAD  HEENT: anicteric sclera, oropharynx clear, MMM  Neck: No JVD  Respiratory: CTAB, no wheezes, rales or rhonchi  Cardiovascular: S1, S2, RRR  Gastrointestinal: BS+, soft, NT/ND  Extremities: No cyanosis or clubbing. No peripheral edema  Neurological: A/O x 3, no focal deficits  Psychiatric: Normal mood, normal affect  : No CVA tenderness. No richey.   Skin: No rashes  Vascular Access:    LABS:      135  |  106  |  63<HH>  ----------------------------<  74  5.2<H>   |  16<L>  |  2.3<H>    Ca    8.5      23 Mar 2020 04:45    TPro  5.4<L>  /  Alb  3.4<L>  /  TBili  0.3  /  DBili      /  AST  21  /  ALT  11  /  AlkPhos  75      Creatinine Trend: 2.3 <--, 2.7 <--, 2.6 <--                        10.5   5.16  )-----------( 186      ( 23 Mar 2020 04:45 )             33.0     Urine Studies:  Urinalysis Basic - ( 23 Mar 2020 00:00 )    Color: Yellow / Appearance: Slightly Turbid / S.011 / pH:   Gluc:  / Ketone: Negative  / Bili: Negative / Urobili: <2 mg/dL   Blood:  / Protein: Trace / Nitrite: Negative   Leuk Esterase: Negative / RBC: 3 /HPF / WBC 17 /HPF   Sq Epi:  / Non Sq Epi: 3 /HPF / Bacteria: Negative      Creatinine, Random Urine: 114 mg/dL ( @ 00:00)  Sodium, Random Urine: 22.0 mmoL/L ( @ 00:00)            RADIOLOGY & ADDITIONAL STUDIES: NEPHROLOGY CONSULTATION NOTE    75 yo F with Med hx of HTN, DM complicated by Neuropathy, Gout, Psoriasis lymphoma , presenting for cough and fever of several days duration. Patient said she was in contact with a family member who tested positive for Covid 19 .  To note that patient developed diarrhea for several days no hematemesis no melena no rash no dysuria no hematuria     PAST MEDICAL & SURGICAL HISTORY:  Psoriasis  Gout  High cholesterol  DM (diabetes mellitus)  Lymphoma  CKD (chronic kidney disease)  HTN (hypertension)    Allergies:  predniSONE (Unknown)    Home Medications Reviewed  Hospital Medications:   MEDICATIONS  (STANDING):  folic acid 1 milliGRAM(s) Oral daily  heparin  Injectable 5000 Unit(s) SubCutaneous every 12 hours  simvastatin 40 milliGRAM(s) Oral at bedtime  sodium chloride 0.9%. 1000 milliLiter(s) (60 mL/Hr) IV Continuous <Continuous>  verapamil  milliGRAM(s) Oral daily      SOCIAL HISTORY:  Denies ETOH,Smoking,   FAMILY HISTORY:        REVIEW OF SYSTEMS:    All other review of systems is negative unless indicated above.    VITALS:  T(F): 98 (20 @ 13:03), Max: 98.9 (20 @ 17:03)  HR: 70 (20 @ 13:03)  BP: 138/62 (20 @ 13:03)  RR: 19 (20 @ 13:03)  SpO2: 98% (20 @ 00:07)        I&O's Detail        LABS:      135  |  106  |  63<HH>  ----------------------------<  74  5.2<H>   |  16<L>  |  2.3<H>    Ca    8.5      23 Mar 2020 04:45    TPro  5.4<L>  /  Alb  3.4<L>  /  TBili  0.3  /  DBili      /  AST  21  /  ALT  11  /  AlkPhos  75      Creatinine Trend: 2.3 <--, 2.7 <--, 2.6 <--                        10.5   5.16  )-----------( 186      ( 23 Mar 2020 04:45 )             33.0     Urine Studies:  Urinalysis Basic - ( 23 Mar 2020 00:00 )    Color: Yellow / Appearance: Slightly Turbid / S.011 / pH:   Gluc:  / Ketone: Negative  / Bili: Negative / Urobili: <2 mg/dL   Blood:  / Protein: Trace / Nitrite: Negative   Leuk Esterase: Negative / RBC: 3 /HPF / WBC 17 /HPF   Sq Epi:  / Non Sq Epi: 3 /HPF / Bacteria: Negative      Creatinine, Random Urine: 114 mg/dL ( @ 00:00)  Sodium, Random Urine: 22.0 mmoL/L ( @ 00:00)            RADIOLOGY & ADDITIONAL STUDIES:      < from: Xray Chest 1 View-PORTABLE IMMEDIATE (20 @ 17:27) >    Impression:      1. Questionable new 9 mm right upper lobe nodular opacity; if clinically warranted, further evaluation may be obtained with dedicated CT chest    < end of copied text >

## 2020-03-23 NOTE — PROGRESS NOTE ADULT - SUBJECTIVE AND OBJECTIVE BOX
SELINA ADAMS 74y Female  MRN#: [id number]   Hospital Day: 1d    SUBJECTIVE  Patient is a 74y old Female who presents with a chief complaint of R/O COVID (23 Mar 2020 15:16)  Currently admitted to medicine with the primary diagnosis of Weakness    INTERVAL HPI AND OVERNIGHT EVENTS:  I attempted to call the patient today at her personal number twice without answer.  Please see attending attestation for ROS.  Vitally stable today and no O2 requirements today.    OBJECTIVE  PAST MEDICAL & SURGICAL HISTORY  Psoriasis  Gout  High cholesterol  DM (diabetes mellitus)  Lymphoma  CKD (chronic kidney disease)  HTN (hypertension)    ALLERGIES:  predniSONE (Unknown)    MEDICATIONS:  STANDING MEDICATIONS  folic acid 1 milliGRAM(s) Oral daily  heparin  Injectable 5000 Unit(s) SubCutaneous every 12 hours  simvastatin 40 milliGRAM(s) Oral at bedtime  sodium chloride 0.45% 1000 milliLiter(s) IV Continuous <Continuous>  verapamil  milliGRAM(s) Oral daily    PRN MEDICATIONS      VITAL SIGNS: Last 24 Hours  T(C): 36.7 (23 Mar 2020 13:03), Max: 37 (22 Mar 2020 22:02)  T(F): 98 (23 Mar 2020 13:03), Max: 98.6 (22 Mar 2020 22:02)  HR: 70 (23 Mar 2020 13:03) (60 - 74)  BP: 138/62 (23 Mar 2020 13:03) (138/62 - 185/75)  BP(mean): --  RR: 19 (23 Mar 2020 13:03) (18 - 19)  SpO2: 98% (23 Mar 2020 00:07) (98% - 100%)    LABS:                        10.5   5.16  )-----------( 186      ( 23 Mar 2020 04:45 )             33.0     03-23    135  |  106  |  63<HH>  ----------------------------<  74  5.2<H>   |  16<L>  |  2.3<H>    Ca    8.5      23 Mar 2020 04:45    TPro  5.4<L>  /  Alb  3.4<L>  /  TBili  0.3  /  DBili  x   /  AST  21  /  ALT  11  /  AlkPhos  75  03-23      Urinalysis Basic - ( 23 Mar 2020 00:00 )    Color: Yellow / Appearance: Slightly Turbid / S.011 / pH: x  Gluc: x / Ketone: Negative  / Bili: Negative / Urobili: <2 mg/dL   Blood: x / Protein: Trace / Nitrite: Negative   Leuk Esterase: Negative / RBC: 3 /HPF / WBC 17 /HPF   Sq Epi: x / Non Sq Epi: 3 /HPF / Bacteria: Negative        Sedimentation Rate, Erythrocyte: 107 mm/Hr <H> (20 @ 21:41)      CARDIAC MARKERS ( 22 Mar 2020 17:06 )  x     / <0.01 ng/mL / x     / x     / x          RADIOLOGY  < from: Xray Chest 1 View-PORTABLE IMMEDIATE (20 @ 17:27) >  Impression:      1. Questionable new 9 mm right upper lobe nodular opacity; if clinically warranted, further evaluation may be obtained with dedicated CT chest.    < end of copied text >        PHYSICAL EXAM:  Please see attending attestation for Physical exam findings.     ASSESSMENT & PLAN  75 yo F with Med hx of HTN, DM complicated by Neuropathy, Gout, Psoriasis, presents for evaluation of generalized weakness, Dry Cough, SOB for 3-4 days duration.    Generalized weakness/dry cough and SOB: R/O COVID  Patient was not septic on admission   Afebrile overnight   HD stable w/o O2 requirements (No hypoxic resp faliure)-->SPO2 98%    CBC demonstrates no lymphopenia nor does CMP show elevated LFTs  +exposure history (daughter's sister in law at same function two weeks ago)   COVID panel pending   ESR: >121, CRP: 6, D-Dimer 383, Fibrinogen>700,    Will send D-Dimer, CRP, ESR, Fibrinogen, Procal,  CBC, CMP for the AM   CXR: No bilateral infiltrates but a new 9mm right upper lobe nodular opacity.    ID: Monitor off abx, DC ASAP from ID perspective       #9mm RUL nodular opacity   CXR 3/22/2020-->9mm RUL nodule   No known smoking history   Further work up required as nodule is >.8cm   Likely needs CT chest outpatient to assess whether there is LAD, or if nodule is spiculated or round       PINEDA on CKD 4  SCr downtrending from 2.7 to 2.7 today on IVF   K+ 5.2  Nephrology consulted: Wants Renal US-->Cannot do until patient is COVID negatie  UA, Gigi, UCr ordered   IVF fluids changed to 1/2NS w/75mEq as patient has metabolic acidosis as well.   Repeat BMP in AM   Lisinopril d/c'd in setting of PINEDA     H/O Essential Hypertension:  -c/w Verapamil 180mg PO daily   - Stable today at 138/62    #History of Gout   Patient previously on allopurinol 300mg PO daily  Likely held in setting of PINEDA   Will trend BMP in AM     #History of Psoriasis  c/w methotrexate qweekly      H/O DM II   -Diet Controlled, per pt: A1C: 5.5% for the last 2 yrs   -Refusing FS, c/w Carb Consistent diet    #Normocytic Anemia  Hgb 10.5  MCV 93  Ferritin 753 ---> consistent with ACD  Will send Iron Studies in AM   No signs of overt bleeding    #HLD  c/w Simvastatin 40mg PO daily       DVT PPX// GI PPX: Heparin Sub Q// not indicated   Full Code  Dispo from home   Pending COVID r/o

## 2020-03-23 NOTE — CONSULT NOTE ADULT - SUBJECTIVE AND OBJECTIVE BOX
SELINA ADAMS  74y, Female  Allergy: predniSONE (Unknown)      CHIEF COMPLAINT: R/O COVID (22 Mar 2020 20:32)      LOS  1d    HPI:  73 yo F with Med hx of HTN, DM complicated by Neuropathy, Gout, Psoriasis, presents for evaluation of generalized weakness, Dry Cough, SOB for 3-4 days duration. Patient states that she was at a function 2 weeks ago and she recently found out that her daughter's sister in-law who was also at the function tested positive for COVID and is currently on quarantine at home. Pt states she went to urgent care, and was told to come to the ED for further testing. Patient also endorses diarrhea for about one week which got worse today, associated with blood when she wipes but its consistent with her hemorrhoids     Patient denies fever, chills, headache, Abdominal pain, nausea, vomiting, Chest pain, Palpitation, no recent travel     In ED vitals and Labs were unremarkable (22 Mar 2020 20:32)      PAST MEDICAL & SURGICAL HISTORY:  Psoriasis  Gout  High cholesterol  DM (diabetes mellitus)  Lymphoma  CKD (chronic kidney disease)  HTN (hypertension)      FAMILY HISTORY  non-contributory     SOCIAL HISTORY  Social History:        ROS  General: Denies rigors, nightsweats  HEENT: Denies headache, rhinorrhea, sore throat, eye pain  CV: Denies CP, palpitations  PULM: as noted above   GI: as noted above   : Denies discharge, hematuria  MSK: Denies arthralgias, myalgias  SKIN: Denies rash, lesions  NEURO: Denies paresthesias, weakness  PSYCH: Denies depression, anxiety    VITALS:  T(F): 96.9, Max: 98.9 (20 @ 17:03)  HR: 74  BP: 185/75  RR: 18Vital Signs Last 24 Hrs  T(C): 36.1 (23 Mar 2020 04:50), Max: 37.2 (22 Mar 2020 17:03)  T(F): 96.9 (23 Mar 2020 04:50), Max: 98.9 (22 Mar 2020 17:03)  HR: 74 (23 Mar 2020 04:50) (60 - 82)  BP: 185/75 (23 Mar 2020 04:50) (118/56 - 185/75)  BP(mean): --  RR: 18 (23 Mar 2020 04:50) (17 - 18)  SpO2: 98% (23 Mar 2020 00:07) (98% - 100%)    PHYSICAL EXAM:  Gen: NAD on RA  HEENT: NCAT  Resp: b/l chest expansion  Neuro: nonfocal     TESTS & MEASUREMENTS:                        10.5   5.16  )-----------( 186      ( 23 Mar 2020 04:45 )             33.0         135  |  106  |  63<HH>  ----------------------------<  74  5.2<H>   |  16<L>  |  2.3<H>    Ca    8.5      23 Mar 2020 04:45    TPro  5.4<L>  /  Alb  3.4<L>  /  TBili  0.3  /  DBili  x   /  AST  21  /  ALT  11  /  AlkPhos  75      eGFR if Non African American: 20 mL/min/1.73M2 (20 @ 04:45)  eGFR if African American: 23 mL/min/1.73M2 (20 @ 04:45)  eGFR if Non African American: 16 mL/min/1.73M2 (20 @ 21:41)  eGFR if : 19 mL/min/1.73M2 (20 @ 21:41)  eGFR if Non African American: 17 mL/min/1.73M2 (20 @ 17:06)  eGFR if African American: 20 mL/min/1.73M2 (20 @ 17:06)    LIVER FUNCTIONS - ( 23 Mar 2020 04:45 )  Alb: 3.4 g/dL / Pro: 5.4 g/dL / ALK PHOS: 75 U/L / ALT: 11 U/L / AST: 21 U/L / GGT: x           Urinalysis Basic - ( 23 Mar 2020 00:00 )    Color: Yellow / Appearance: Slightly Turbid / S.011 / pH: x  Gluc: x / Ketone: Negative  / Bili: Negative / Urobili: <2 mg/dL   Blood: x / Protein: Trace / Nitrite: Negative   Leuk Esterase: Negative / RBC: 3 /HPF / WBC 17 /HPF   Sq Epi: x / Non Sq Epi: 3 /HPF / Bacteria: Negative          Blood Gas Venous - Lactate: 1.8 mmoL/L (20 @ 17:11)      INFECTIOUS DISEASES TESTING      RADIOLOGY & ADDITIONAL TESTS:  I have personally reviewed the last Chest xray  CXR      CT      CARDIOLOGY TESTING  12 Lead ECG:   Ventricular Rate 65 BPM    Atrial Rate 65 BPM    P-R Interval 178 ms    QRS Duration 82 ms    Q-T Interval 410 ms    QTC Calculation(Bezet) 426 ms    P Axis 54 degrees    R Axis 40 degrees    T Axis 58 degrees    Diagnosis Line Normal sinus rhythm  Nonspecific T wave abnormality  Abnormal ECG    Confirmed by Sanford Gaxiola ([id number]) on 3/22/2020 9:51:44 PM (20 @ 16:49)      MEDICATIONS  folic acid 1  heparin  Injectable 5000  simvastatin 40  sodium chloride 0.9%. 1000  verapamil       Weight      ANTIBIOTICS:      ALLERGIES:  predniSONE (Unknown)

## 2020-03-23 NOTE — PROGRESS NOTE ADULT - SUBJECTIVE AND OBJECTIVE BOX
SELINA ADAMS    Patient is a 74y old  Female who presents with a chief complaint of R/O COVID (23 Mar 2020 15:16)    INTERVAL HPI/OVERNIGHT EVENTS: no events overnight, pt feels overall better, she still does not have appetite though.    CONSTITUTIONAL: +generalized  weakness, no fevers or chills  EYES/ENT: No visual changes;  No vertigo or throat pain   NECK: No pain or stiffness  RESPIRATORY: No cough, wheezing, hemoptysis; No shortness of breath  CARDIOVASCULAR: No chest pain or palpitations  GASTROINTESTINAL: No abdominal or epigastric pain. No nausea, vomiting, or hematemesis; No diarrhea or constipation. No melena or hematochezia.  GENITOURINARY: No dysuria, frequency or hematuria  NEUROLOGICAL: No numbness or weakness  SKIN: No itching, rashes     PHYSICAL EXAM:  T(C): 36.7, Max: 37 (20 @ 22:02)  HR: 70 (60 - 74)  BP: 138/62 (138/62 - 185/75)  RR: 19 (18 - 19)  SpO2: 98% (98% - 100%)    GENERAL: NAD  NECK: Supple, No JVD  PULMONARY/CHEST: No rales, rhonchi, wheezing  CARDIOVASC: Regular rate and rhythm; No murmurs  GI/ABDOMEN: Soft, Nontender, Nondistended; Bowel sounds present  EXTREMITIES:  2+ Peripheral Pulses, No clubbing, cyanosis, or edema, no deformity. No calf tenderness b/l.  SKIN: No rashes or lesions  NERVOUS SYSTEM:  Alert & Oriented X3, no focal deficit     Consultant(s) Notes Reviewed by me.     LABS:                        10.5   5.16  )-----------( 186      ( 23 Mar 2020 04:45 )             33.0     03-    135  |  106  |  63<HH>  ----------------------------<  74  5.2<H>   |  16<L>  |  2.3<H>    Ca    8.5      23 Mar 2020 04:45    TPro  5.4<L>  /  Alb  3.4<L>  /  TBili  0.3  /  DBili  x   /  AST  21  /  ALT  11  /  AlkPhos  75  03      Urinalysis Basic - ( 23 Mar 2020 00:00 )    Color: Yellow / Appearance: Slightly Turbid / S.011 / pH: x  Gluc: x / Ketone: Negative  / Bili: Negative / Urobili: <2 mg/dL   Blood: x / Protein: Trace / Nitrite: Negative   Leuk Esterase: Negative / RBC: 3 /HPF / WBC 17 /HPF   Sq Epi: x / Non Sq Epi: 3 /HPF / Bacteria: Negative      CAPILLARY BLOOD GLUCOSE  POCT Blood Glucose.: 114 mg/dL (23 Mar 2020 15:52)  POCT Blood Glucose.: 106 mg/dL (23 Mar 2020 11:08)  POCT Blood Glucose.: 71 mg/dL (23 Mar 2020 08:32)  POCT Blood Glucose.: 80 mg/dL (22 Mar 2020 21:58)      RADIOLOGY & ADDITIONAL TESTS:  < from: Xray Chest 1 View-PORTABLE IMMEDIATE (20 @ 17:27) >  Impression:      1. Questionable new 9 mm right upper lobe nodular opacity; if clinically warranted, further evaluation may be obtained with dedicated CT chest.    < end of copied text >        MEDICATIONS  (STANDING):  folic acid 1 milliGRAM(s) Oral daily  heparin  Injectable 5000 Unit(s) SubCutaneous every 12 hours  simvastatin 40 milliGRAM(s) Oral at bedtime  sodium chloride 0.45% 1000 milliLiter(s) (60 mL/Hr) IV Continuous <Continuous>  verapamil  milliGRAM(s) Oral daily    MEDICATIONS  (PRN):

## 2020-03-23 NOTE — PROGRESS NOTE ADULT - ASSESSMENT
73 yo F pt p/w generalized weakness and decreased PO intake.     # Generalized weakness, doubt COVID since pt denies SOB, cough and fever. Likely due to PINEDA.  - f/u COVID PCR  - no need for ABx  - f/u RVP    # PINEDA on CKD, not clear what stage, ?pre or post renal  - cont IVF as per nephro  - cannot check US kidneys until COVID negative, no signs of obstruction, will do straight cath.  - urine lytes, creat/prot sent  - cont isolation  # Metabolic acidosis - Bicarb given IV, monitor BMP  # Hyperkalemia - due to CKD/PINEDA  # Hypertension – need for optimization  # Dyslipidemia – on out-pt statin therapy  # Hx of psoriasis – stable on methotrexate  # Hx of gout – w/o acute symptoms on allopurinol  # Normocytic anemia likely anemia of chronic disease    DVT ppx  If creatinine better tomorrow possibly will be discharged with OP f/u with nephro

## 2020-03-24 ENCOUNTER — TRANSCRIPTION ENCOUNTER (OUTPATIENT)
Age: 75
End: 2020-03-24

## 2020-03-24 VITALS
TEMPERATURE: 98 F | RESPIRATION RATE: 18 BRPM | SYSTOLIC BLOOD PRESSURE: 163 MMHG | DIASTOLIC BLOOD PRESSURE: 79 MMHG | HEART RATE: 70 BPM

## 2020-03-24 LAB
ALBUMIN SERPL ELPH-MCNC: 3.3 G/DL — LOW (ref 3.5–5.2)
ALP SERPL-CCNC: 80 U/L — SIGNIFICANT CHANGE UP (ref 30–115)
ALT FLD-CCNC: 10 U/L — SIGNIFICANT CHANGE UP (ref 0–41)
ANION GAP SERPL CALC-SCNC: 14 MMOL/L — SIGNIFICANT CHANGE UP (ref 7–14)
APPEARANCE UR: CLEAR — SIGNIFICANT CHANGE UP
AST SERPL-CCNC: 16 U/L — SIGNIFICANT CHANGE UP (ref 0–41)
BASOPHILS # BLD AUTO: 0.01 K/UL — SIGNIFICANT CHANGE UP (ref 0–0.2)
BASOPHILS NFR BLD AUTO: 0.2 % — SIGNIFICANT CHANGE UP (ref 0–1)
BILIRUB SERPL-MCNC: 0.3 MG/DL — SIGNIFICANT CHANGE UP (ref 0.2–1.2)
BILIRUB UR-MCNC: NEGATIVE — SIGNIFICANT CHANGE UP
BUN SERPL-MCNC: 41 MG/DL — HIGH (ref 10–20)
CALCIUM SERPL-MCNC: 8.5 MG/DL — SIGNIFICANT CHANGE UP (ref 8.5–10.1)
CHLORIDE SERPL-SCNC: 112 MMOL/L — HIGH (ref 98–110)
CO2 SERPL-SCNC: 21 MMOL/L — SIGNIFICANT CHANGE UP (ref 17–32)
COLOR SPEC: SIGNIFICANT CHANGE UP
CREAT ?TM UR-MCNC: 70 MG/DL — SIGNIFICANT CHANGE UP
CREAT ?TM UR-MCNC: 74 MG/DL — SIGNIFICANT CHANGE UP
CREAT SERPL-MCNC: 1.5 MG/DL — SIGNIFICANT CHANGE UP (ref 0.7–1.5)
D DIMER BLD IA.RAPID-MCNC: 349 NG/ML DDU — HIGH (ref 0–230)
DIFF PNL FLD: NEGATIVE — SIGNIFICANT CHANGE UP
EOSINOPHIL # BLD AUTO: 0.2 K/UL — SIGNIFICANT CHANGE UP (ref 0–0.7)
EOSINOPHIL NFR BLD AUTO: 4.4 % — SIGNIFICANT CHANGE UP (ref 0–8)
ERYTHROCYTE [SEDIMENTATION RATE] IN BLOOD: 126 MM/HR — HIGH (ref 0–20)
FIBRINOGEN PPP-MCNC: >700 MG/DL — HIGH (ref 204.4–570.6)
GLUCOSE BLDC GLUCOMTR-MCNC: 123 MG/DL — HIGH (ref 70–99)
GLUCOSE BLDC GLUCOMTR-MCNC: 91 MG/DL — SIGNIFICANT CHANGE UP (ref 70–99)
GLUCOSE SERPL-MCNC: 87 MG/DL — SIGNIFICANT CHANGE UP (ref 70–99)
GLUCOSE UR QL: NEGATIVE — SIGNIFICANT CHANGE UP
HCT VFR BLD CALC: 31.5 % — LOW (ref 37–47)
HGB BLD-MCNC: 10.1 G/DL — LOW (ref 12–16)
IMM GRANULOCYTES NFR BLD AUTO: 0.9 % — HIGH (ref 0.1–0.3)
KETONES UR-MCNC: NEGATIVE — SIGNIFICANT CHANGE UP
LDH SERPL L TO P-CCNC: 244 — HIGH (ref 50–242)
LEUKOCYTE ESTERASE UR-ACNC: NEGATIVE — SIGNIFICANT CHANGE UP
LYMPHOCYTES # BLD AUTO: 0.94 K/UL — LOW (ref 1.2–3.4)
LYMPHOCYTES # BLD AUTO: 20.9 % — SIGNIFICANT CHANGE UP (ref 20.5–51.1)
MAGNESIUM SERPL-MCNC: 2.1 MG/DL — SIGNIFICANT CHANGE UP (ref 1.8–2.4)
MCHC RBC-ENTMCNC: 30.6 PG — SIGNIFICANT CHANGE UP (ref 27–31)
MCHC RBC-ENTMCNC: 32.1 G/DL — SIGNIFICANT CHANGE UP (ref 32–37)
MCV RBC AUTO: 95.5 FL — SIGNIFICANT CHANGE UP (ref 81–99)
MONOCYTES # BLD AUTO: 0.3 K/UL — SIGNIFICANT CHANGE UP (ref 0.1–0.6)
MONOCYTES NFR BLD AUTO: 6.7 % — SIGNIFICANT CHANGE UP (ref 1.7–9.3)
NEUTROPHILS # BLD AUTO: 3.01 K/UL — SIGNIFICANT CHANGE UP (ref 1.4–6.5)
NEUTROPHILS NFR BLD AUTO: 66.9 % — SIGNIFICANT CHANGE UP (ref 42.2–75.2)
NITRITE UR-MCNC: NEGATIVE — SIGNIFICANT CHANGE UP
NRBC # BLD: 0 /100 WBCS — SIGNIFICANT CHANGE UP (ref 0–0)
OSMOLALITY UR: 447 MOS/KG — SIGNIFICANT CHANGE UP (ref 50–1400)
PH UR: 6 — SIGNIFICANT CHANGE UP (ref 5–8)
PLATELET # BLD AUTO: 216 K/UL — SIGNIFICANT CHANGE UP (ref 130–400)
POTASSIUM SERPL-MCNC: 5.2 MMOL/L — HIGH (ref 3.5–5)
POTASSIUM SERPL-SCNC: 5.2 MMOL/L — HIGH (ref 3.5–5)
PROT SERPL-MCNC: 5.3 G/DL — LOW (ref 6–8)
PROT UR-MCNC: SIGNIFICANT CHANGE UP
RBC # BLD: 3.3 M/UL — LOW (ref 4.2–5.4)
RBC # FLD: 13.6 % — SIGNIFICANT CHANGE UP (ref 11.5–14.5)
SARS-COV-2 RNA SPEC QL NAA+PROBE: SIGNIFICANT CHANGE UP
SODIUM SERPL-SCNC: 147 MMOL/L — HIGH (ref 135–146)
SODIUM UR-SCNC: 60 MMOL/L — SIGNIFICANT CHANGE UP
SP GR SPEC: 1.01 — SIGNIFICANT CHANGE UP (ref 1.01–1.02)
UROBILINOGEN FLD QL: SIGNIFICANT CHANGE UP
WBC # BLD: 4.5 K/UL — LOW (ref 4.8–10.8)
WBC # FLD AUTO: 4.5 K/UL — LOW (ref 4.8–10.8)

## 2020-03-24 PROCEDURE — 99239 HOSP IP/OBS DSCHRG MGMT >30: CPT

## 2020-03-24 RX ORDER — ALLOPURINOL 300 MG
0.5 TABLET ORAL
Qty: 15 | Refills: 0
Start: 2020-03-24 | End: 2020-04-22

## 2020-03-24 RX ORDER — LISINOPRIL 2.5 MG/1
1 TABLET ORAL
Qty: 0 | Refills: 0 | DISCHARGE

## 2020-03-24 RX ORDER — LANOLIN ALCOHOL/MO/W.PET/CERES
5 CREAM (GRAM) TOPICAL AT BEDTIME
Refills: 0 | Status: DISCONTINUED | OUTPATIENT
Start: 2020-03-24 | End: 2020-03-24

## 2020-03-24 RX ORDER — ALLOPURINOL 300 MG
1 TABLET ORAL
Qty: 0 | Refills: 0 | DISCHARGE

## 2020-03-24 RX ADMIN — Medication 180 MILLIGRAM(S): at 05:16

## 2020-03-24 RX ADMIN — HEPARIN SODIUM 5000 UNIT(S): 5000 INJECTION INTRAVENOUS; SUBCUTANEOUS at 05:16

## 2020-03-24 RX ADMIN — METHOTREXATE 2.5 MILLIGRAM(S): 2.5 TABLET ORAL at 11:39

## 2020-03-24 NOTE — DISCHARGE NOTE PROVIDER - NSDCMRMEDTOKEN_GEN_ALL_CORE_FT
allopurinol 300 mg oral tablet: 1 tab(s) orally once a day  folic acid 1 mg oral tablet: 1 tab(s) orally once a day  lisinopril 20 mg oral tablet: 1 tab(s) orally once a day  methotrexate 2.5 mg oral tablet:   simvastatin 40 mg oral tablet: 1 tab(s) orally once a day (at bedtime)  verapamil 180 mg/12 hours oral tablet, extended release: 1 tab(s) orally once a day (in the morning) allopurinol 100 mg oral tablet: 0.5 tab(s) orally once a day.    Please take 50mg per day.   folic acid 1 mg oral tablet: 1 tab(s) orally once a day  lisinopril 20 mg oral tablet: 1 tab(s) orally once a day  methotrexate 2.5 mg oral tablet:   simvastatin 40 mg oral tablet: 1 tab(s) orally once a day (at bedtime)  verapamil 180 mg/12 hours oral tablet, extended release: 1 tab(s) orally once a day (in the morning) allopurinol 100 mg oral tablet: 0.5 tab(s) orally once a day.    Please take 50mg per day.   folic acid 1 mg oral tablet: 1 tab(s) orally once a day  methotrexate 2.5 mg oral tablet:   simvastatin 40 mg oral tablet: 1 tab(s) orally once a day (at bedtime)  verapamil 180 mg/12 hours oral tablet, extended release: 1 tab(s) orally once a day (in the morning)

## 2020-03-24 NOTE — DISCHARGE NOTE PROVIDER - PROVIDER TOKENS
PROVIDER:[TOKEN:[[id number]],FOLLOWUP:[2 weeks]] PROVIDER:[TOKEN:[[id number]],FOLLOWUP:[2 weeks]],PROVIDER:[TOKEN:[[id number]],FOLLOWUP:[1 week]]

## 2020-03-24 NOTE — DISCHARGE NOTE NURSING/CASE MANAGEMENT/SOCIAL WORK - PATIENT PORTAL LINK FT
You can access the FollowMyHealth Patient Portal offered by [facility] by registering at the following website: [email or web address]. By joining [facility]’s FollowMyHealth portal, you will also be able to view your health information using other applications (apps) compatible with our system.

## 2020-03-24 NOTE — PROGRESS NOTE ADULT - SUBJECTIVE AND OBJECTIVE BOX
SELINA ADAMS    Patient is a 74y old  Female who presents with a chief complaint of R/O COVID (24 Mar 2020 12:28)    INTERVAL HPI/OVERNIGHT EVENTS: no events overnight, pt feels better, she is eating and drinking better, denies chest pain, no SOB or fever.    CONSTITUTIONAL: No weakness, fevers or chills  EYES/ENT: No visual changes;  No vertigo or throat pain   NECK: No pain or stiffness  RESPIRATORY: No cough, wheezing, hemoptysis; No shortness of breath  CARDIOVASCULAR: No chest pain or palpitations  GASTROINTESTINAL: No abdominal or epigastric pain. No nausea, vomiting, or hematemesis; No diarrhea or constipation. No melena or hematochezia.  GENITOURINARY: No dysuria, frequency or hematuria  NEUROLOGICAL: No numbness or weakness  SKIN: No itching, rashes     PHYSICAL EXAM:  T(C): 36.9, Max: 36.9 (--20 @ 14:21)  HR: 70 (70 - 78)  BP: 163/79 (163/79 - 169/72)  RR: 18 (18 - 18)  SpO2: 95% (95% - 95%)    GENERAL: NAD  NECK: Supple, No JVD  PULMONARY/CHEST: No rales, rhonchi, wheezing  CARDIOVASC: Regular rate and rhythm; No murmurs  GI/ABDOMEN: Soft, Nontender, Nondistended; Bowel sounds present  EXTREMITIES:  2+ Peripheral Pulses, No clubbing, cyanosis, or edema, no deformity. No calf tenderness b/l.  SKIN: No rashes or lesions  NERVOUS SYSTEM:  Alert & Oriented X3, no focal deficit     Consultant(s) Notes Reviewed by me.     LABS:                        10.1   4.50  )-----------( 216      ( 24 Mar 2020 08:34 )             31.5     03-24    147<H>  |  112<H>  |  41<H>  ----------------------------<  87  5.2<H>   |  21  |  1.5    Ca    8.5      24 Mar 2020 08:34  Mg     2.1     03-24    TPro  5.3<L>  /  Alb  3.3<L>  /  TBili  0.3  /  DBili  x   /  AST  16  /  ALT  10  /  AlkPhos  80  03-24      Urinalysis Basic - ( 24 Mar 2020 05:35 )    Color: Light Yellow / Appearance: Clear / S.014 / pH: x  Gluc: x / Ketone: Negative  / Bili: Negative / Urobili: <2 mg/dL   Blood: x / Protein: Trace / Nitrite: Negative   Leuk Esterase: Negative / RBC: x / WBC x   Sq Epi: x / Non Sq Epi: x / Bacteria: x      CAPILLARY BLOOD GLUCOSE  POCT Blood Glucose.: 123 mg/dL (24 Mar 2020 11:13)  POCT Blood Glucose.: 91 mg/dL (24 Mar 2020 08:17)  POCT Blood Glucose.: 148 mg/dL (23 Mar 2020 21:15)      MEDICATIONS  (STANDING):  folic acid 1 milliGRAM(s) Oral daily  heparin  Injectable 5000 Unit(s) SubCutaneous every 12 hours  melatonin 5 milliGRAM(s) Oral at bedtime  methotrexate 2.5 milliGRAM(s) Oral every week  simvastatin 40 milliGRAM(s) Oral at bedtime  sodium chloride 0.45% 1000 milliLiter(s) (60 mL/Hr) IV Continuous <Continuous>  verapamil  milliGRAM(s) Oral daily    MEDICATIONS  (PRN):

## 2020-03-24 NOTE — DISCHARGE NOTE PROVIDER - HOSPITAL COURSE
75 yo F with Med hx of HTN, DM complicated by Neuropathy, Gout, Psoriasis, presents for evaluation of generalized weakness, Dry Cough, SOB for 3-4 days duration. Patient states that she was at a function 2 weeks ago and she recently found out that her daughter's sister in-law who was also at the function tested positive for COVID and is currently on quarantine at home. Pt states she went to urgent care, and was told to come to the ED for further testing. Patient also endorses diarrhea for about one week which got worse today, associated with blood when she wipes but its consistent with her hemorrhoids         Patient was admitted to  for isolation for COVID r/o as she was exposed to someone who tested positive.  CBC and CMP were unremarkable. CXR showed a 9mm RUL nodule but no other consilidations or infiltrates.  Patient was never febrile nor hypoxic during course of hospital admission.          Patient denies fever, chills, headache, Abdominal pain, nausea, vomiting, Chest pain, Palpitation, no recent travel         In ED vitals and Labs were unremarkable 75 yo F with Med hx of HTN, DM complicated by Neuropathy, Gout, Psoriasis, presents for evaluation of generalized weakness, Dry Cough, SOB for 3-4 days duration. Patient states that she was at a function 2 weeks ago and she recently found out that her daughter's sister in-law who was also at the function tested positive for COVID and is currently on quarantine at home. Pt states she went to urgent care, and was told to come to the ED for further testing. Patient also endorses diarrhea for about one week which got worse today, associated with blood when she wipes but its consistent with her hemorrhoids         Patient was admitted to  for isolation for COVID r/o as she was exposed to someone who tested positive.  CBC and CMP were unremarkable. CXR showed a 9mm RUL nodule but no other consilidations or infiltrates.  Patient was never febrile nor hypoxic during course of hospital admission.          Patient denies fever, chills, headache, Abdominal pain, nausea, vomiting, Chest pain, Palpitation, no recent travel         Initial CXR exhibited a 9mm nodule on CXR w/o bilateral infiltrates.  Patient never required O2 supplementation.  ID was consulted and recommended to discharge patient from ID perspective.  Patient's COVID-19 panel was negative.  Patient's stay was complicated by PINEDA and BUN/Cr was greater than 20:1, signifying possible pre-renal injury.  Patient was given IVF which led to resolution of PINEDA.  BUN/Cr have downtrended and SCr is now at 1.5.  Patient will continue home medication with exception of allopurinol which is now cut down from 150mg PO daily to 50mg PO daily with follow up with her PMD.

## 2020-03-24 NOTE — DISCHARGE NOTE PROVIDER - NSDCCPCAREPLAN_GEN_ALL_CORE_FT
PRINCIPAL DISCHARGE DIAGNOSIS  Diagnosis: Exposure to [facility] coronavirus  Assessment and Plan of Treatment:       SECONDARY DISCHARGE DIAGNOSES  Diagnosis: Acute kidney injury  Assessment and Plan of Treatment: PRINCIPAL DISCHARGE DIAGNOSIS  Diagnosis: Exposure to [facility] coronavirus  Assessment and Plan of Treatment: You were exposed to someone who has COVID.  Because of your symptoms, we screened you for COVID-19 and the panel turned out to be negative.  It is very important that you maintain social distancing between yourself and family and friends.  Refrain from sharing personal items such as utensils, towels, etc.  Please follow up with your primary care doctor.   Avoid touching your eyes, nose, and mouth with your hands.  Avoid sharing personal house items .  Avoid sharing dishes, dirnking glasses cups, utensils, towels, or bedding with other people or pets.Wash your hands often with soap and water for at least 15 to 20 seconds. You may also clean your hands with an alcohol based hand  that contains 60-95% alcohol by covering all surfaces of your hands and rubbing them together until they feel dry.      SECONDARY DISCHARGE DIAGNOSES  Diagnosis: Solitary pulmonary nodule  Assessment and Plan of Treatment: You had a 9mm pulmonary nodule in the right upper lung. Your breathing and oxygenation was stable  throughout hospital stay.  You may need a CT of the chest outpatient to monitor this nodule.  Please follow up with your pulmonlogist.    Diagnosis: Acute kidney injury  Assessment and Plan of Treatment: Your kidney function was found to be decreased on your admission.  This was likely due to inadequate hydration.  Please ensure that you are eating and drinking adequately.  You were given IV Fluids which helped resolve this acute injury.    Your allopurinol was reduced from 150mg to 50mg PO daily just so that your kidneys have an easier time filtering out the drug.  Please follow up with your PMD.

## 2020-03-24 NOTE — PROGRESS NOTE ADULT - ASSESSMENT
73 yo F with Med hx of HTN, DM complicated by Neuropathy, Gout, Psoriasis, presents for evaluation of generalized weakness, Dry Cough, SOB for 3-4 days duration. Patient states that she was at a function 2 weeks ago and she recently found out that her daughter's sister in-law who was also at the function tested positive for COVID and is currently on quarantine at home.     # PINEDA on CKD 3 due to decreased PO intake  - creatinine better after IV hydration, today 1.5  - pt will f/u with OP nephro dr. Hatch  - no obstruction, straight cath done less than 150 cc drained.  - pt advied oral hydration  - cont PO bicarb  # Metabolic acidosis - resolved  # Hyperkalemia - due to CKD/PINEDA, meds reviewed, will dc Lisinopril  # Hypertension – cont home meds  # Dyslipidemia – on out-pt statin therapy  # Hx of psoriasis – stable on methotrexate  # Hx of gout – will decrease dose of Allopurinol based on kidneys function  # Normocytic anemia likely anemia of chronic disease    Pt is clinically stable for discharge home today. 75 yo F with Med hx of HTN, DM complicated by Neuropathy, Gout, Psoriasis, presents for evaluation of generalized weakness, Dry Cough, SOB for 3-4 days duration. Patient states that she was at a function 2 weeks ago and she recently found out that her daughter's sister in-law who was also at the function tested positive for COVID and is currently on quarantine at home.     # Suspected COVID19 infection - no respiratory failure, pt instructed to stay isolated at least for 14 days at home  # PINEDA on CKD 3 due to decreased PO intake  - creatinine better after IV hydration, today 1.5  - pt will f/u with OP nephro dr. Hatch  - no obstruction, straight cath done less than 150 cc drained.  - pt advied oral hydration  - cont PO bicarb  # Metabolic acidosis - resolved  # Hyperkalemia - due to CKD/PINEDA, meds reviewed, will dc Lisinopril  # Nodular opacity - pt instructed to f/u with PMD for OP CT chest  and pulm eval, high risk for malignancy given Hx od smoking  # Hypertension – cont home meds  # Dyslipidemia – on out-pt statin therapy  # Hx of psoriasis – stable on methotrexate  # Hx of gout – will decrease dose of Allopurinol based on kidneys function  # Normocytic anemia likely anemia of chronic disease    Pt is clinically stable for discharge home today.

## 2020-03-25 LAB
CRP SERPL-MCNC: 5.16 MG/DL — HIGH (ref 0–0.4)
PROCALCITONIN SERPL-MCNC: 0.16 NG/ML — HIGH (ref 0.02–0.1)

## 2020-03-29 DIAGNOSIS — E87.4 MIXED DISORDER OF ACID-BASE BALANCE: ICD-10-CM

## 2020-03-29 DIAGNOSIS — M10.9 GOUT, UNSPECIFIED: ICD-10-CM

## 2020-03-29 DIAGNOSIS — I12.9 HYPERTENSIVE CHRONIC KIDNEY DISEASE WITH STAGE 1 THROUGH STAGE 4 CHRONIC KIDNEY DISEASE, OR UNSPECIFIED CHRONIC KIDNEY DISEASE: ICD-10-CM

## 2020-03-29 DIAGNOSIS — E87.5 HYPERKALEMIA: ICD-10-CM

## 2020-03-29 DIAGNOSIS — E11.40 TYPE 2 DIABETES MELLITUS WITH DIABETIC NEUROPATHY, UNSPECIFIED: ICD-10-CM

## 2020-03-29 DIAGNOSIS — Z88.8 ALLERGY STATUS TO OTHER DRUGS, MEDICAMENTS AND BIOLOGICAL SUBSTANCES: ICD-10-CM

## 2020-03-29 DIAGNOSIS — D63.8 ANEMIA IN OTHER CHRONIC DISEASES CLASSIFIED ELSEWHERE: ICD-10-CM

## 2020-03-29 DIAGNOSIS — E78.00 PURE HYPERCHOLESTEROLEMIA, UNSPECIFIED: ICD-10-CM

## 2020-03-29 DIAGNOSIS — N17.9 ACUTE KIDNEY FAILURE, UNSPECIFIED: ICD-10-CM

## 2020-03-29 DIAGNOSIS — E11.22 TYPE 2 DIABETES MELLITUS WITH DIABETIC CHRONIC KIDNEY DISEASE: ICD-10-CM

## 2020-03-29 DIAGNOSIS — Z20.828 CONTACT WITH AND (SUSPECTED) EXPOSURE TO OTHER VIRAL COMMUNICABLE DISEASES: ICD-10-CM

## 2020-03-29 DIAGNOSIS — K64.9 UNSPECIFIED HEMORRHOIDS: ICD-10-CM

## 2020-03-29 DIAGNOSIS — C85.90 NON-HODGKIN LYMPHOMA, UNSPECIFIED, UNSPECIFIED SITE: ICD-10-CM

## 2020-03-29 DIAGNOSIS — E87.1 HYPO-OSMOLALITY AND HYPONATREMIA: ICD-10-CM

## 2020-03-29 DIAGNOSIS — R53.1 WEAKNESS: ICD-10-CM

## 2020-03-29 DIAGNOSIS — R91.1 SOLITARY PULMONARY NODULE: ICD-10-CM

## 2020-03-29 DIAGNOSIS — N18.3 CHRONIC KIDNEY DISEASE, STAGE 3 (MODERATE): ICD-10-CM

## 2020-03-29 DIAGNOSIS — R05 COUGH: ICD-10-CM

## 2020-03-29 DIAGNOSIS — L40.9 PSORIASIS, UNSPECIFIED: ICD-10-CM

## 2020-03-29 DIAGNOSIS — R19.7 DIARRHEA, UNSPECIFIED: ICD-10-CM

## 2020-03-29 DIAGNOSIS — Z87.891 PERSONAL HISTORY OF NICOTINE DEPENDENCE: ICD-10-CM

## 2021-12-22 NOTE — PATIENT PROFILE ADULT - VISION (WITH CORRECTIVE LENSES IF THE PATIENT USUALLY WEARS THEM):
PCP: Dr. Maglaie Olivier    Denies Life Threatening Symptoms      Onset: 12/20/21 night  Location / description:  Congestion   Precipitating Factors:   Took Covid-19 test   1 negative  12/22/1        Saliva Covid-19 test positive on yesterday   Pain Scale (1-10), 10 highest: 0/10   Associated Symptoms: Coughing ,Headache, constant chest pressure  What improves / worsens symptoms: Denies  Symptom specific medications: N/A  LMP : 12/21/21  Are you pregnant or breast feeding: No. No.  Recent visits (last 3-4 weeks) for same reason or recent surgery:  Yes, seen in [facility] today.  Yes  10/8/21 Left ankle repair    PLAN:   Directed to Emergency Department / States she will go [facility]  Patient/Caller agrees to follow recommendations.    Reason for Disposition  • SEVERE or constant chest pain or pressure (Exception: mild central chest pain, present only when coughing)    Protocols used: CORONAVIRUS (COVID-19) DIAGNOSED OR [id number]      
Normal vision: sees adequately in most situations; can see medication labels, newsprint

## 2024-06-24 NOTE — PATIENT PROFILE ADULT - NSPROHMCARDIOSYMPCOND_GEN_A_NUR
Detail Level: Detailed
Quality 431: Preventive Care And Screening: Unhealthy Alcohol Use - Screening: Patient not identified as an unhealthy alcohol user when screened for unhealthy alcohol use using a systematic screening method
Quality 130: Documentation Of Current Medications In The Medical Record: Current Medications Documented
Quality 226: Preventive Care And Screening: Tobacco Use: Screening And Cessation Intervention: Patient screened for tobacco use and is an ex/non-smoker
hypertension

## 2025-08-23 ENCOUNTER — NON-APPOINTMENT (OUTPATIENT)
Age: 80
End: 2025-08-23